# Patient Record
Sex: FEMALE | Race: WHITE | NOT HISPANIC OR LATINO | URBAN - METROPOLITAN AREA
[De-identification: names, ages, dates, MRNs, and addresses within clinical notes are randomized per-mention and may not be internally consistent; named-entity substitution may affect disease eponyms.]

---

## 2019-03-11 ENCOUNTER — OFFICE VISIT (OUTPATIENT)
Dept: URBAN - METROPOLITAN AREA CLINIC 33 | Facility: CLINIC | Age: 84
End: 2019-03-11
Payer: MEDICARE

## 2019-03-11 DIAGNOSIS — H04.123 DRY EYE SYNDROME OF BILATERAL LACRIMAL GLANDS: Primary | ICD-10-CM

## 2019-03-11 PROCEDURE — 99213 OFFICE O/P EST LOW 20 MIN: CPT | Performed by: OPTOMETRIST

## 2019-03-11 RX ORDER — CYCLOSPORINE 0.5 MG/ML
0.05 % EMULSION OPHTHALMIC
Qty: 90 | Refills: 3 | Status: ACTIVE
Start: 2019-03-11

## 2019-03-11 RX ORDER — LOTEPREDNOL ETABONATE 5 MG/G
0.5 % GEL OPHTHALMIC
Qty: 1 | Refills: 1 | Status: ACTIVE
Start: 2019-03-11

## 2019-03-11 ASSESSMENT — INTRAOCULAR PRESSURE
OS: 13
OD: 14

## 2019-03-11 ASSESSMENT — KERATOMETRY
OD: 47.88
OS: 47.38

## 2019-03-11 NOTE — IMPRESSION/PLAN
Impression: Dry eye syndrome of bilateral lacrimal glands: H04.123. Plan: Dry eyes account for the patient's complaints. There is no evidence of permanent changes to the cornea. Explained condition does not have a cure and will need artificial tears for maintenance. Patient was educated about therapeutic options for dry eye including artifical tears, prescription topical medications, punctal plugs, and fish oil vitamins. Patient was prescribed Restasis BID OU and understands the side effects of the medication as well as the 2-3 months before signs and symptoms are proven to improve. Recommend patient use gel ATs . RXd Restasis BID OU and Lotemax BID OU.